# Patient Record
Sex: FEMALE | Race: WHITE | NOT HISPANIC OR LATINO | URBAN - METROPOLITAN AREA
[De-identification: names, ages, dates, MRNs, and addresses within clinical notes are randomized per-mention and may not be internally consistent; named-entity substitution may affect disease eponyms.]

---

## 2018-05-25 PROBLEM — 10743008 IRRITABLE BOWEL SYNDROME: Status: ACTIVE | Noted: 2018-05-25

## 2018-05-25 PROBLEM — 266435005 GASTRO-ESOPHAGEAL REFLUX DISEASE WITHOUT ESOPHAGITIS: Status: ACTIVE | Noted: 2018-05-25

## 2022-04-30 ENCOUNTER — TELEPHONE ENCOUNTER (OUTPATIENT)
Dept: URBAN - METROPOLITAN AREA CLINIC 121 | Facility: CLINIC | Age: 47
End: 2022-04-30

## 2022-04-30 RX ORDER — IBUPROFEN 600 MG/1
TABLET, FILM COATED ORAL
OUTPATIENT
Start: 2018-05-25

## 2022-04-30 RX ORDER — IBUPROFEN 600 MG/1
TABLET, FILM COATED ORAL
OUTPATIENT
Start: 2018-05-25 | End: 2018-12-21

## 2022-05-01 ENCOUNTER — TELEPHONE ENCOUNTER (OUTPATIENT)
Dept: URBAN - METROPOLITAN AREA CLINIC 121 | Facility: CLINIC | Age: 47
End: 2022-05-01

## 2022-05-01 RX ORDER — OMEPRAZOLE 20 MG/1
1 CAPSULE PO QD CAPSULE, DELAYED RELEASE ORAL
Status: ACTIVE | COMMUNITY
Start: 2018-05-25

## 2022-05-01 RX ORDER — CALCIUM ACETATE 668 MG
TABLET ORAL
Status: ACTIVE | COMMUNITY
Start: 2018-05-25

## 2022-05-01 RX ORDER — MAGNESIUM OXIDE/MAG AA CHELATE 300 MG
CAPSULE ORAL
Status: ACTIVE | COMMUNITY
Start: 2018-05-25

## 2022-05-01 RX ORDER — HYOSCYAMINE SULFATE 0.12 MG/1
1-2 TABLETS PO TID TABLET ORAL
Status: ACTIVE | COMMUNITY
Start: 2020-04-02

## 2022-05-01 RX ORDER — LACTOBACIL 2/BIFIDO 1/S.THERMO 450B CELL
TAKE 1-2 CAPSULE(S) BY MOUTH DAILY AS DIRECTED PACKET (EA) ORAL
Status: ACTIVE | COMMUNITY
Start: 2019-01-11

## 2022-05-01 RX ORDER — HYOSCYAMINE SULFATE 0.12 MG/1
TABLET ORAL
Status: ACTIVE | COMMUNITY
Start: 2018-05-25

## 2022-05-01 RX ORDER — MULTIVITAMIN
TABLET ORAL
Status: ACTIVE | COMMUNITY
Start: 2018-05-25

## 2022-05-01 RX ORDER — LACTOBACIL 2/BIFIDO 1/S.THERMO 450B CELL
TAKE 1-2 CAPSULES BY MOUTH DAILY AS DIRECTED PACKET (EA) ORAL
Status: ACTIVE | COMMUNITY
Start: 2020-01-24

## 2022-05-01 RX ORDER — LACTOBACIL 2/BIFIDO 1/S.THERMO 900B CELL
MIX 2 PACKETS WITH LIQUID QD  AS DIRECTED PACKET (EA) ORAL
Status: ACTIVE | COMMUNITY
Start: 2018-05-25

## 2022-05-01 RX ORDER — RIZATRIPTAN BENZOATE 10 MG/1
TABLET ORAL
Status: ACTIVE | COMMUNITY
Start: 2018-05-25

## 2022-05-01 RX ORDER — FAMOTIDINE 10 MG
TABLET ORAL
Status: ACTIVE | COMMUNITY
Start: 2018-05-25

## 2022-05-01 RX ORDER — ASPIRIN 81 MG/1
TABLET, FILM COATED ORAL
Status: ACTIVE | COMMUNITY
Start: 2019-10-18

## 2022-05-01 RX ORDER — DICYCLOMINE HYDROCHLORIDE 20 MG/1
1 TABLET PO TID PRN TABLET ORAL
Status: ACTIVE | COMMUNITY
Start: 2020-04-02

## 2022-05-01 RX ORDER — ASCORBIC ACID 500 MG
TABLET,CHEWABLE ORAL
Status: ACTIVE | COMMUNITY
Start: 2018-05-25

## 2022-05-01 RX ORDER — CETIRIZINE HYDROCHLORIDE 5 MG/1
TABLET ORAL
Status: ACTIVE | COMMUNITY
Start: 2018-05-25

## 2022-10-19 ENCOUNTER — P2P PATIENT RECORD (OUTPATIENT)
Age: 47
End: 2022-10-19

## 2023-10-25 ENCOUNTER — DASHBOARD ENCOUNTERS (OUTPATIENT)
Age: 48
End: 2023-10-25

## 2023-10-25 ENCOUNTER — LAB OUTSIDE AN ENCOUNTER (OUTPATIENT)
Dept: URBAN - METROPOLITAN AREA CLINIC 27 | Facility: CLINIC | Age: 48
End: 2023-10-25

## 2023-10-25 ENCOUNTER — OFFICE VISIT (OUTPATIENT)
Dept: URBAN - METROPOLITAN AREA CLINIC 27 | Facility: CLINIC | Age: 48
End: 2023-10-25
Payer: COMMERCIAL

## 2023-10-25 VITALS
DIASTOLIC BLOOD PRESSURE: 94 MMHG | BODY MASS INDEX: 26.22 KG/M2 | RESPIRATION RATE: 17 BRPM | HEART RATE: 115 BPM | WEIGHT: 177 LBS | HEIGHT: 69 IN | SYSTOLIC BLOOD PRESSURE: 137 MMHG

## 2023-10-25 DIAGNOSIS — K21.9 GASTRO-ESOPHAGEAL REFLUX DISEASE WITHOUT ESOPHAGITIS: ICD-10-CM

## 2023-10-25 DIAGNOSIS — Z12.11 SCREEN FOR COLON CANCER: ICD-10-CM

## 2023-10-25 DIAGNOSIS — Z83.719 FAMILY HISTORY OF POLYPS IN THE COLON: ICD-10-CM

## 2023-10-25 PROCEDURE — 99244 OFF/OP CNSLTJ NEW/EST MOD 40: CPT | Performed by: INTERNAL MEDICINE

## 2023-10-25 RX ORDER — ASCORBIC ACID 500 MG
TABLET,CHEWABLE ORAL
Status: ACTIVE | COMMUNITY
Start: 2018-05-25

## 2023-10-25 RX ORDER — CETIRIZINE HYDROCHLORIDE 5 MG/1
TABLET ORAL
Status: ACTIVE | COMMUNITY
Start: 2018-05-25

## 2023-10-25 RX ORDER — CALCIUM ACETATE 668 MG
TABLET ORAL
Status: ACTIVE | COMMUNITY
Start: 2018-05-25

## 2023-10-25 RX ORDER — MULTIVITAMIN
TABLET ORAL
Status: ACTIVE | COMMUNITY
Start: 2018-05-25

## 2023-10-25 RX ORDER — ASPIRIN 81 MG/1
TABLET, FILM COATED ORAL
Status: ACTIVE | COMMUNITY
Start: 2019-10-18

## 2023-10-25 RX ORDER — LACTOBACIL 2/BIFIDO 1/S.THERMO 450B CELL
TAKE 1-2 CAPSULE(S) BY MOUTH DAILY AS DIRECTED PACKET (EA) ORAL
Status: ACTIVE | COMMUNITY
Start: 2019-01-11

## 2023-10-25 RX ORDER — LACTOBACIL 2/BIFIDO 1/S.THERMO 450B CELL
TAKE 1-2 CAPSULES BY MOUTH DAILY AS DIRECTED PACKET (EA) ORAL
Status: ACTIVE | COMMUNITY
Start: 2020-01-24

## 2023-10-25 RX ORDER — DICYCLOMINE HYDROCHLORIDE 20 MG/1
1 TABLET PO TID PRN TABLET ORAL
Status: ACTIVE | COMMUNITY
Start: 2020-04-02

## 2023-10-25 RX ORDER — FAMOTIDINE 10 MG
TABLET ORAL
Status: ACTIVE | COMMUNITY
Start: 2018-05-25

## 2023-10-25 RX ORDER — LACTOBACIL 2/BIFIDO 1/S.THERMO 900B CELL
MIX 2 PACKETS WITH LIQUID QD  AS DIRECTED PACKET (EA) ORAL
Status: ACTIVE | COMMUNITY
Start: 2018-05-25

## 2023-10-25 RX ORDER — RIZATRIPTAN BENZOATE 10 MG/1
TABLET ORAL
Status: ACTIVE | COMMUNITY
Start: 2018-05-25

## 2023-10-25 RX ORDER — HYOSCYAMINE SULFATE 0.12 MG/1
TABLET ORAL
Status: ACTIVE | COMMUNITY
Start: 2018-05-25

## 2023-10-25 RX ORDER — MAGNESIUM OXIDE/MAG AA CHELATE 300 MG
CAPSULE ORAL
Status: ACTIVE | COMMUNITY
Start: 2018-05-25

## 2023-10-25 RX ORDER — OMEPRAZOLE 20 MG/1
1 CAPSULE PO QD CAPSULE, DELAYED RELEASE ORAL
Status: ACTIVE | COMMUNITY
Start: 2018-05-25

## 2023-10-25 NOTE — HPI-TODAY'S VISIT:
This is a 48-year-old female seen in consultation today for intermittent stomach pains.  She has had 3 episodes of vomiting since February.  All 3 episodes occurred after a combination of alcohol and coffee.  She describes reflux.  She takes omeprazole daily which generally controls the symptoms but she will have some breakthrough.  She has a history of a hamartomatous polyp of the stomach that was removed in the past.  In the last month she stopped drinking coffee and is doing a little bit better.  She takes Trintellix for depression, Adderall for ADHD and an aspirin a day for history of pulmonary embolus in 2018 at the time of hysterectomy.  They feel it was related to the surgery and not a predisposition.  Overall she is doing well.  No dysphagia.  She has not undergone screening colonoscopy.  Her mother has a history of colon polyps Rerred by Monik Sullivan.

## 2024-02-09 ENCOUNTER — LAB (OUTPATIENT)
Dept: URBAN - METROPOLITAN AREA CLINIC 4 | Facility: CLINIC | Age: 49
End: 2024-02-09
Payer: COMMERCIAL

## 2024-02-09 ENCOUNTER — COL/EGD (OUTPATIENT)
Dept: URBAN - METROPOLITAN AREA SURGERY CENTER 7 | Facility: SURGERY CENTER | Age: 49
End: 2024-02-09
Payer: COMMERCIAL

## 2024-02-09 DIAGNOSIS — K22.89 DILATATION OF ESOPHAGUS: ICD-10-CM

## 2024-02-09 DIAGNOSIS — K31.89 ACHYLIA: ICD-10-CM

## 2024-02-09 DIAGNOSIS — D12.3 ADENOMA OF TRANSVERSE COLON: ICD-10-CM

## 2024-02-09 DIAGNOSIS — K31.7 BENIGN GASTRIC POLYP: ICD-10-CM

## 2024-02-09 DIAGNOSIS — K31.89 OTHER DISEASES OF STOMACH AND DUODENUM: ICD-10-CM

## 2024-02-09 DIAGNOSIS — Z12.11 COLON CANCER SCREENING: ICD-10-CM

## 2024-02-09 DIAGNOSIS — D12.4 ADENOMA OF DESCENDING COLON: ICD-10-CM

## 2024-02-09 PROCEDURE — 45381 COLONOSCOPY SUBMUCOUS NJX: CPT | Performed by: INTERNAL MEDICINE

## 2024-02-09 PROCEDURE — 88305 TISSUE EXAM BY PATHOLOGIST: CPT | Performed by: PATHOLOGY

## 2024-02-09 PROCEDURE — 45385 COLONOSCOPY W/LESION REMOVAL: CPT | Performed by: INTERNAL MEDICINE

## 2024-02-09 PROCEDURE — 43239 EGD BIOPSY SINGLE/MULTIPLE: CPT | Performed by: INTERNAL MEDICINE

## 2024-02-09 RX ORDER — HYOSCYAMINE SULFATE 0.12 MG/1
TABLET ORAL
Status: ACTIVE | COMMUNITY
Start: 2018-05-25

## 2024-02-09 RX ORDER — CETIRIZINE HYDROCHLORIDE 5 MG/1
TABLET ORAL
Status: ACTIVE | COMMUNITY
Start: 2018-05-25

## 2024-02-09 RX ORDER — DICYCLOMINE HYDROCHLORIDE 20 MG/1
1 TABLET PO TID PRN TABLET ORAL
Status: ACTIVE | COMMUNITY
Start: 2020-04-02

## 2024-02-09 RX ORDER — CALCIUM ACETATE 668 MG
TABLET ORAL
Status: ACTIVE | COMMUNITY
Start: 2018-05-25

## 2024-02-09 RX ORDER — LACTOBACIL 2/BIFIDO 1/S.THERMO 450B CELL
TAKE 1-2 CAPSULE(S) BY MOUTH DAILY AS DIRECTED PACKET (EA) ORAL
Status: ACTIVE | COMMUNITY
Start: 2019-01-11

## 2024-02-09 RX ORDER — LACTOBACIL 2/BIFIDO 1/S.THERMO 450B CELL
TAKE 1-2 CAPSULES BY MOUTH DAILY AS DIRECTED PACKET (EA) ORAL
Status: ACTIVE | COMMUNITY
Start: 2020-01-24

## 2024-02-09 RX ORDER — FAMOTIDINE 10 MG
TABLET ORAL
Status: ACTIVE | COMMUNITY
Start: 2018-05-25

## 2024-02-09 RX ORDER — ASPIRIN 81 MG/1
TABLET, FILM COATED ORAL
Status: ACTIVE | COMMUNITY
Start: 2019-10-18

## 2024-02-09 RX ORDER — MAGNESIUM OXIDE/MAG AA CHELATE 300 MG
CAPSULE ORAL
Status: ACTIVE | COMMUNITY
Start: 2018-05-25

## 2024-02-09 RX ORDER — LACTOBACIL 2/BIFIDO 1/S.THERMO 900B CELL
MIX 2 PACKETS WITH LIQUID QD  AS DIRECTED PACKET (EA) ORAL
Status: ACTIVE | COMMUNITY
Start: 2018-05-25

## 2024-02-09 RX ORDER — ASCORBIC ACID 500 MG
TABLET,CHEWABLE ORAL
Status: ACTIVE | COMMUNITY
Start: 2018-05-25

## 2024-02-09 RX ORDER — MULTIVITAMIN
TABLET ORAL
Status: ACTIVE | COMMUNITY
Start: 2018-05-25

## 2024-02-09 RX ORDER — OMEPRAZOLE 20 MG/1
1 CAPSULE PO QD CAPSULE, DELAYED RELEASE ORAL
Status: ACTIVE | COMMUNITY
Start: 2018-05-25

## 2024-02-09 RX ORDER — RIZATRIPTAN BENZOATE 10 MG/1
TABLET ORAL
Status: ACTIVE | COMMUNITY
Start: 2018-05-25

## 2025-04-24 ENCOUNTER — LAB OUTSIDE AN ENCOUNTER (OUTPATIENT)
Dept: URBAN - METROPOLITAN AREA SURGERY CENTER 7 | Facility: SURGERY CENTER | Age: 50
End: 2025-04-24

## 2025-04-25 ENCOUNTER — OFFICE VISIT (OUTPATIENT)
Dept: URBAN - METROPOLITAN AREA SURGERY CENTER 7 | Facility: SURGERY CENTER | Age: 50
End: 2025-04-25

## 2025-04-25 ENCOUNTER — CLAIMS CREATED FROM THE CLAIM WINDOW (OUTPATIENT)
Dept: URBAN - METROPOLITAN AREA CLINIC 4 | Facility: CLINIC | Age: 50
End: 2025-04-25
Payer: COMMERCIAL

## 2025-04-25 DIAGNOSIS — D12.2 BENIGN NEOPLASM OF ASCENDING COLON: ICD-10-CM

## 2025-04-25 DIAGNOSIS — K63.5 POLYP OF COLON: ICD-10-CM

## 2025-04-25 PROCEDURE — 88305 TISSUE EXAM BY PATHOLOGIST: CPT | Performed by: PATHOLOGY

## 2025-04-25 RX ORDER — OMEPRAZOLE 20 MG/1
1 CAPSULE PO QD CAPSULE, DELAYED RELEASE ORAL
Status: ACTIVE | COMMUNITY
Start: 2018-05-25

## 2025-04-25 RX ORDER — RIZATRIPTAN BENZOATE 10 MG/1
TABLET ORAL
Status: ACTIVE | COMMUNITY
Start: 2018-05-25

## 2025-04-25 RX ORDER — DICYCLOMINE HYDROCHLORIDE 20 MG/1
1 TABLET PO TID PRN TABLET ORAL
Status: ACTIVE | COMMUNITY
Start: 2020-04-02

## 2025-04-25 RX ORDER — ASCORBIC ACID 500 MG
TABLET,CHEWABLE ORAL
Status: ACTIVE | COMMUNITY
Start: 2018-05-25

## 2025-04-25 RX ORDER — LACTOBACIL 2/BIFIDO 1/S.THERMO 900B CELL
MIX 2 PACKETS WITH LIQUID QD  AS DIRECTED PACKET (EA) ORAL
Status: ACTIVE | COMMUNITY
Start: 2018-05-25

## 2025-04-25 RX ORDER — FAMOTIDINE 10 MG
TABLET ORAL
Status: ACTIVE | COMMUNITY
Start: 2018-05-25

## 2025-04-25 RX ORDER — LACTOBACIL 2/BIFIDO 1/S.THERMO 450B CELL
TAKE 1-2 CAPSULE(S) BY MOUTH DAILY AS DIRECTED PACKET (EA) ORAL
Status: ACTIVE | COMMUNITY
Start: 2019-01-11

## 2025-04-25 RX ORDER — CETIRIZINE HYDROCHLORIDE 5 MG/1
TABLET ORAL
Status: ACTIVE | COMMUNITY
Start: 2018-05-25

## 2025-04-25 RX ORDER — HYOSCYAMINE SULFATE 0.12 MG/1
TABLET ORAL
Status: ACTIVE | COMMUNITY
Start: 2018-05-25

## 2025-04-25 RX ORDER — LACTOBACIL 2/BIFIDO 1/S.THERMO 450B CELL
TAKE 1-2 CAPSULES BY MOUTH DAILY AS DIRECTED PACKET (EA) ORAL
Status: ACTIVE | COMMUNITY
Start: 2020-01-24

## 2025-04-25 RX ORDER — CALCIUM ACETATE 668 MG
TABLET ORAL
Status: ACTIVE | COMMUNITY
Start: 2018-05-25

## 2025-04-25 RX ORDER — MULTIVITAMIN
TABLET ORAL
Status: ACTIVE | COMMUNITY
Start: 2018-05-25

## 2025-04-25 RX ORDER — ASPIRIN 81 MG/1
TABLET, FILM COATED ORAL
Status: ACTIVE | COMMUNITY
Start: 2019-10-18

## 2025-04-25 RX ORDER — MAGNESIUM OXIDE/MAG AA CHELATE 300 MG
CAPSULE ORAL
Status: ACTIVE | COMMUNITY
Start: 2018-05-25